# Patient Record
Sex: MALE | Race: WHITE
[De-identification: names, ages, dates, MRNs, and addresses within clinical notes are randomized per-mention and may not be internally consistent; named-entity substitution may affect disease eponyms.]

---

## 2018-08-19 PROBLEM — Z00.00 ENCOUNTER FOR PREVENTIVE HEALTH EXAMINATION: Status: ACTIVE | Noted: 2018-08-19

## 2018-09-07 ENCOUNTER — RECORD ABSTRACTING (OUTPATIENT)
Age: 58
End: 2018-09-07

## 2018-09-07 DIAGNOSIS — Z80.9 FAMILY HISTORY OF MALIGNANT NEOPLASM, UNSPECIFIED: ICD-10-CM

## 2018-09-07 DIAGNOSIS — Z87.39 PERSONAL HISTORY OF OTHER DISEASES OF THE MUSCULOSKELETAL SYSTEM AND CONNECTIVE TISSUE: ICD-10-CM

## 2018-09-07 DIAGNOSIS — Z83.3 FAMILY HISTORY OF DIABETES MELLITUS: ICD-10-CM

## 2018-09-07 LAB
HBA1C MFR BLD: 6.9
PODIATRY EVAL: NORMAL

## 2018-09-07 RX ORDER — ASPIRIN 81 MG/1
81 TABLET, CHEWABLE ORAL DAILY
Refills: 0 | Status: ACTIVE | COMMUNITY

## 2018-09-07 RX ORDER — BLOOD-GLUCOSE METER
KIT MISCELLANEOUS
Refills: 0 | Status: ACTIVE | COMMUNITY

## 2018-09-18 ENCOUNTER — APPOINTMENT (OUTPATIENT)
Dept: ENDOCRINOLOGY | Facility: CLINIC | Age: 58
End: 2018-09-18
Payer: COMMERCIAL

## 2018-09-18 VITALS
WEIGHT: 142 LBS | BODY MASS INDEX: 22.82 KG/M2 | DIASTOLIC BLOOD PRESSURE: 80 MMHG | HEART RATE: 66 BPM | HEIGHT: 66 IN | SYSTOLIC BLOOD PRESSURE: 124 MMHG

## 2018-09-18 DIAGNOSIS — F17.200 NICOTINE DEPENDENCE, UNSPECIFIED, UNCOMPLICATED: ICD-10-CM

## 2018-09-18 DIAGNOSIS — E16.0 DRUG-INDUCED HYPOGLYCEMIA W/OUT COMA: ICD-10-CM

## 2018-09-18 LAB — GLUCOSE BLDC GLUCOMTR-MCNC: 149

## 2018-09-18 PROCEDURE — 99214 OFFICE O/P EST MOD 30 MIN: CPT | Mod: 25

## 2018-09-18 PROCEDURE — 82962 GLUCOSE BLOOD TEST: CPT

## 2018-11-12 ENCOUNTER — MEDICATION RENEWAL (OUTPATIENT)
Age: 58
End: 2018-11-12

## 2018-11-13 ENCOUNTER — MEDICATION RENEWAL (OUTPATIENT)
Age: 58
End: 2018-11-13

## 2018-12-16 ENCOUNTER — MOBILE ON CALL (OUTPATIENT)
Age: 58
End: 2018-12-16

## 2018-12-19 ENCOUNTER — RX RENEWAL (OUTPATIENT)
Age: 58
End: 2018-12-19

## 2019-01-08 ENCOUNTER — RECORD ABSTRACTING (OUTPATIENT)
Age: 59
End: 2019-01-08

## 2019-01-15 ENCOUNTER — APPOINTMENT (OUTPATIENT)
Dept: ENDOCRINOLOGY | Facility: CLINIC | Age: 59
End: 2019-01-15
Payer: COMMERCIAL

## 2019-01-15 VITALS — DIASTOLIC BLOOD PRESSURE: 80 MMHG | SYSTOLIC BLOOD PRESSURE: 130 MMHG

## 2019-01-15 VITALS
BODY MASS INDEX: 23.78 KG/M2 | HEART RATE: 67 BPM | DIASTOLIC BLOOD PRESSURE: 80 MMHG | HEIGHT: 66 IN | SYSTOLIC BLOOD PRESSURE: 140 MMHG | WEIGHT: 148 LBS

## 2019-01-15 LAB
GLUCOSE BLDC GLUCOMTR-MCNC: 82
HBA1C MFR BLD HPLC: 7.2
LDLC SERPL DIRECT ASSAY-MCNC: 35
MICROALBUMIN/CREAT 24H UR-RTO: 73

## 2019-01-15 PROCEDURE — 99214 OFFICE O/P EST MOD 30 MIN: CPT | Mod: 25

## 2019-01-15 RX ORDER — LANCETS 33 GAUGE
EACH MISCELLANEOUS
Qty: 400 | Refills: 1 | Status: ACTIVE | COMMUNITY
Start: 1900-01-01 | End: 1900-01-01

## 2019-01-15 RX ORDER — FLASH GLUCOSE SENSOR
KIT MISCELLANEOUS
Qty: 0 | Refills: 0 | Status: COMPLETED | OUTPATIENT
Start: 2019-01-15

## 2019-01-15 RX ORDER — ROSUVASTATIN CALCIUM 20 MG/1
20 TABLET, FILM COATED ORAL DAILY
Qty: 90 | Refills: 1 | Status: ACTIVE | COMMUNITY
Start: 1900-01-01 | End: 1900-01-01

## 2019-01-15 RX ADMIN — Medication 0: at 00:00

## 2019-02-04 ENCOUNTER — MESSAGE (OUTPATIENT)
Age: 59
End: 2019-02-04

## 2019-04-15 ENCOUNTER — RX RENEWAL (OUTPATIENT)
Age: 59
End: 2019-04-15

## 2019-04-30 ENCOUNTER — APPOINTMENT (OUTPATIENT)
Dept: ENDOCRINOLOGY | Facility: CLINIC | Age: 59
End: 2019-04-30

## 2019-05-13 ENCOUNTER — APPOINTMENT (OUTPATIENT)
Dept: ENDOCRINOLOGY | Facility: CLINIC | Age: 59
End: 2019-05-13
Payer: COMMERCIAL

## 2019-05-13 ENCOUNTER — LABORATORY RESULT (OUTPATIENT)
Age: 59
End: 2019-05-13

## 2019-05-13 DIAGNOSIS — I25.10 ATHEROSCLEROTIC HEART DISEASE OF NATIVE CORONARY ARTERY W/OUT ANGINA PECTORIS: ICD-10-CM

## 2019-05-13 PROCEDURE — 36415 COLL VENOUS BLD VENIPUNCTURE: CPT

## 2019-05-14 LAB
ALBUMIN SERPL ELPH-MCNC: 5.1 G/DL
ALP BLD-CCNC: 54 U/L
ALT SERPL-CCNC: 23 U/L
ANION GAP SERPL CALC-SCNC: 13 MMOL/L
AST SERPL-CCNC: 23 U/L
BILIRUB SERPL-MCNC: 0.8 MG/DL
BUN SERPL-MCNC: 15 MG/DL
CALCIUM SERPL-MCNC: 10.3 MG/DL
CHLORIDE SERPL-SCNC: 104 MMOL/L
CHOLEST SERPL-MCNC: 107 MG/DL
CHOLEST/HDLC SERPL: 2.2 RATIO
CO2 SERPL-SCNC: 25 MMOL/L
CREAT SERPL-MCNC: 0.87 MG/DL
GLUCOSE SERPL-MCNC: 164 MG/DL
HBA1C MFR BLD HPLC: 7.4 %
HDLC SERPL-MCNC: 48 MG/DL
LDLC SERPL CALC-MCNC: 37 MG/DL
POTASSIUM SERPL-SCNC: 4.5 MMOL/L
PROT SERPL-MCNC: 7.2 G/DL
SODIUM SERPL-SCNC: 142 MMOL/L
TRIGL SERPL-MCNC: 108 MG/DL

## 2019-05-20 ENCOUNTER — APPOINTMENT (OUTPATIENT)
Dept: ENDOCRINOLOGY | Facility: CLINIC | Age: 59
End: 2019-05-20
Payer: COMMERCIAL

## 2019-05-20 VITALS
BODY MASS INDEX: 22.18 KG/M2 | SYSTOLIC BLOOD PRESSURE: 110 MMHG | HEART RATE: 80 BPM | WEIGHT: 138 LBS | DIASTOLIC BLOOD PRESSURE: 80 MMHG | HEIGHT: 66 IN

## 2019-05-20 LAB
GLUCOSE BLDC GLUCOMTR-MCNC: 199
HBA1C MFR BLD HPLC: 7.4

## 2019-05-20 PROCEDURE — 99214 OFFICE O/P EST MOD 30 MIN: CPT | Mod: 25

## 2019-05-20 PROCEDURE — 82962 GLUCOSE BLOOD TEST: CPT

## 2019-05-20 PROCEDURE — 83036 HEMOGLOBIN GLYCOSYLATED A1C: CPT | Mod: QW

## 2019-05-20 NOTE — HISTORY OF PRESENT ILLNESS
[FreeTextEntry1] : Pt. is currently scheduled on 19 for left shoulder rotator cuff repair surgery. \par \par Quality: Type 2 DM\par Severity: moderate\par Duration: 18 years \par Onset: routine labs \par Modifying Factors: Better with medication \par Associated Symptoms: denies neuropathy \par \par Current Regimen:\par Metformin  mg 1 tabs BID\par Glimepiride 2 mg 2 tabs daily \par Tradjenta 5 mg daily  \par \par Self blood sugar monitorin-5 times a day, no meter, no logs\par per pt. average blood sugar \par \par exercise: none\par \par Diet:\par B- peanut butter jelly sandwich, egg sandwich\par L- cold cuts\par D- variers\par Snacks- none\par \par Date of last eye exam:  (-) diabetic retinopathy \par Date of last foot exam: 3 years ago\par Date of last flu vaccine: 2018\par Date of last Pneumovax: 2018

## 2019-05-20 NOTE — ASSESSMENT
[Importance of Diet and Exercise] : importance of diet and exercise to improve glycemic control, achieve weight loss and improve cardiovascular health [FreeTextEntry1] : 57 y/o male with Type 2 DM, Hyperlipidemia, and HTN. Today - A1C 7.4% \par \par Pt. is cleared by endocrinology for left shoulder rotator cuff repair on 5/29/19.  \par \par Plan:\par Type 2 DM: labs now\par - continue current medication regimen: Metformin  mg 1 tabs BID, Glimepiride 2 mg 2 tabs daily, and Tradjenta 5 mg daily  \par - continue self blood sugar monitoring\par - bring in meter to next visit\par - discussed modifying diet in detail\par - encouraged to increase routine exercise\par \par Hyperlipidemia: check lipids, continue Rosuvastatin\par \par HTN: stable, continue Ramipril \par \par Labs and follow up visit in 3 months.

## 2019-05-20 NOTE — REVIEW OF SYSTEMS
[Fatigue] : no fatigue [Decreased Appetite] : appetite not decreased [Recent Weight Gain (___ Lbs)] : no recent weight gain [Recent Weight Loss (___ Lbs)] : no recent weight loss [Visual Field Defect] : no visual field defect [Dysphagia] : no dysphagia [Blurry Vision] : no blurred vision [Chest Pain] : no chest pain [Neck Pain] : no neck pain [Dysphonia] : no dysphonia [Palpitations] : no palpitations [SOB on Exertion] : no shortness of breath during exertion [Constipation] : no constipation [Diarrhea] : no diarrhea [Dysuria] : no dysuria [Polyuria] : no polyuria [Headache] : no headaches [Depression] : no depression [Tremors] : no tremors [Anxiety] : no anxiety [Polydipsia] : no polydipsia [Cold Intolerance] : cold tolerant [Heat Intolerance] : heat tolerant [Easy Bruising] : no tendency for easy bruising [Swelling] : no swelling [FreeTextEntry2] : weight stable

## 2019-05-20 NOTE — PHYSICAL EXAM
[Alert] : alert [Well Nourished] : well nourished [Well Developed] : well developed [No Acute Distress] : no acute distress [Normal Hearing] : hearing was normal [EOMI] : extra ocular movement intact [Normal Sclera/Conjunctiva] : normal sclera/conjunctiva [Thyroid Not Enlarged] : the thyroid was not enlarged [No LAD] : no lymphadenopathy [Supple] : the neck was supple [Normal Rate and Effort] : normal respiratory rhythm and effort [No Accessory Muscle Use] : no accessory muscle use [No Thyroid Nodules] : there were no palpable thyroid nodules [Normal S1, S2] : normal S1 and S2 [Normal Rate] : heart rate was normal  [Clear to Auscultation] : lungs were clear to auscultation bilaterally [Regular Rhythm] : with a regular rhythm [Normal Bowel Sounds] : normal bowel sounds [Pedal Pulses Normal] : the pedal pulses are present [No Edema] : there was no peripheral edema [Soft] : abdomen soft [Normal Gait] : normal gait [Not Tender] : non-tender [Right Foot Was Examined] : right foot ~C was examined [No Rash] : no rash [Left Foot Was Examined] : left foot ~C was examined [Normal] : normal [Full ROM] : with full range of motion [No Motor Deficits] : the motor exam was normal [No Tremors] : no tremors [Oriented x3] : oriented to person, place, and time [Normal Insight/Judgement] : insight and judgment were intact [Normal Mood] : the mood was normal [Acanthosis Nigricans] : no acanthosis nigricans [Diminished Throughout Both Feet] : normal tactile sensation with monofilament testing throughout both feet

## 2019-05-20 NOTE — REASON FOR VISIT
[Follow-Up: _____] : a [unfilled] follow-up visit [FreeTextEntry1] : Pre surgical clearance for left shoulder rotator cuff repair

## 2019-05-21 LAB
BASOPHILS # BLD AUTO: 0.1 K/UL
BASOPHILS NFR BLD AUTO: 1.4 %
EOSINOPHIL # BLD AUTO: 0.29 K/UL
EOSINOPHIL NFR BLD AUTO: 4.1 %
HCT VFR BLD CALC: 48.3 %
HGB BLD-MCNC: 14.6 G/DL
IMM GRANULOCYTES NFR BLD AUTO: 0.1 %
LYMPHOCYTES # BLD AUTO: 1.5 K/UL
LYMPHOCYTES NFR BLD AUTO: 21 %
MAN DIFF?: NORMAL
MCHC RBC-ENTMCNC: 28 PG
MCHC RBC-ENTMCNC: 30.2 GM/DL
MCV RBC AUTO: 92.7 FL
MONOCYTES # BLD AUTO: 0.89 K/UL
MONOCYTES NFR BLD AUTO: 12.5 %
NEUTROPHILS # BLD AUTO: 4.34 K/UL
NEUTROPHILS NFR BLD AUTO: 60.9 %
PLATELET # BLD AUTO: 219 K/UL
RBC # BLD: 5.21 M/UL
RBC # FLD: 13.7 %
WBC # FLD AUTO: 7.13 K/UL

## 2019-05-30 ENCOUNTER — RESULT REVIEW (OUTPATIENT)
Age: 59
End: 2019-05-30

## 2019-05-30 LAB
25(OH)D3 SERPL-MCNC: 37.1 NG/ML
ALBUMIN SERPL ELPH-MCNC: 4.5 G/DL
ALP BLD-CCNC: 54 U/L
ALT SERPL-CCNC: 21 U/L
ANION GAP SERPL CALC-SCNC: 11 MMOL/L
AST SERPL-CCNC: 17 U/L
BILIRUB SERPL-MCNC: 0.8 MG/DL
BUN SERPL-MCNC: 15 MG/DL
CALCIUM SERPL-MCNC: 9.6 MG/DL
CHLORIDE SERPL-SCNC: 105 MMOL/L
CHOLEST SERPL-MCNC: 93 MG/DL
CHOLEST/HDLC SERPL: 2.3 RATIO
CO2 SERPL-SCNC: 25 MMOL/L
CREAT SERPL-MCNC: 0.84 MG/DL
ESTIMATED AVERAGE GLUCOSE: 166 MG/DL
GLUCOSE SERPL-MCNC: 158 MG/DL
HBA1C MFR BLD HPLC: 7.4 %
HDLC SERPL-MCNC: 41 MG/DL
LDLC SERPL CALC-MCNC: 40 MG/DL
POTASSIUM SERPL-SCNC: 4.7 MMOL/L
PROT SERPL-MCNC: 6.6 G/DL
SODIUM SERPL-SCNC: 141 MMOL/L
TRIGL SERPL-MCNC: 60 MG/DL
TSH SERPL-ACNC: 1.02 UIU/ML
VIT B12 SERPL-MCNC: 635 PG/ML

## 2019-07-16 ENCOUNTER — MEDICATION RENEWAL (OUTPATIENT)
Age: 59
End: 2019-07-16

## 2019-07-17 ENCOUNTER — RX RENEWAL (OUTPATIENT)
Age: 59
End: 2019-07-17

## 2019-08-20 ENCOUNTER — APPOINTMENT (OUTPATIENT)
Dept: ENDOCRINOLOGY | Facility: CLINIC | Age: 59
End: 2019-08-20

## 2019-10-09 ENCOUNTER — RX RENEWAL (OUTPATIENT)
Age: 59
End: 2019-10-09

## 2019-11-04 ENCOUNTER — RX RENEWAL (OUTPATIENT)
Age: 59
End: 2019-11-04

## 2019-11-12 ENCOUNTER — RX RENEWAL (OUTPATIENT)
Age: 59
End: 2019-11-12

## 2019-12-01 ENCOUNTER — RX RENEWAL (OUTPATIENT)
Age: 59
End: 2019-12-01

## 2019-12-26 ENCOUNTER — RX RENEWAL (OUTPATIENT)
Age: 59
End: 2019-12-26

## 2019-12-31 ENCOUNTER — MEDICATION RENEWAL (OUTPATIENT)
Age: 59
End: 2019-12-31

## 2020-02-14 LAB
HBA1C MFR BLD HPLC: 7.2
HBA1C MFR BLD HPLC: 7.6
LDLC SERPL DIRECT ASSAY-MCNC: 35
LDLC SERPL DIRECT ASSAY-MCNC: 54
MICROALBUMIN/CREAT 24H UR-RTO: 73

## 2020-02-15 ENCOUNTER — APPOINTMENT (OUTPATIENT)
Dept: ENDOCRINOLOGY | Facility: CLINIC | Age: 60
End: 2020-02-15
Payer: COMMERCIAL

## 2020-02-15 VITALS
HEIGHT: 66 IN | DIASTOLIC BLOOD PRESSURE: 76 MMHG | BODY MASS INDEX: 21.21 KG/M2 | HEART RATE: 76 BPM | SYSTOLIC BLOOD PRESSURE: 114 MMHG | WEIGHT: 132 LBS

## 2020-02-15 DIAGNOSIS — J44.9 CHRONIC OBSTRUCTIVE PULMONARY DISEASE, UNSPECIFIED: ICD-10-CM

## 2020-02-15 LAB — GLUCOSE BLDC GLUCOMTR-MCNC: 187

## 2020-02-15 PROCEDURE — 99214 OFFICE O/P EST MOD 30 MIN: CPT | Mod: 25

## 2020-02-15 PROCEDURE — 82962 GLUCOSE BLOOD TEST: CPT

## 2020-02-15 RX ORDER — METOPROLOL SUCCINATE 25 MG/1
25 TABLET, EXTENDED RELEASE ORAL
Qty: 90 | Refills: 0 | Status: ACTIVE | COMMUNITY
Start: 2019-12-09

## 2020-02-15 RX ORDER — BLOOD SUGAR DIAGNOSTIC
STRIP MISCELLANEOUS
Qty: 4 | Refills: 1 | Status: ACTIVE | COMMUNITY
Start: 2019-07-17 | End: 1900-01-01

## 2020-02-15 NOTE — DATA REVIEWED
[FreeTextEntry1] : Labs 11/23/19\par Gluc 131, A1c 7.6\par Cr 0.79, GFR 98\par LDL 54, HDL 43. Tg 75\par TSH 1.40\par Vit D 38

## 2020-02-15 NOTE — ASSESSMENT
[FreeTextEntry1] : Type 2 DM comp by CAD and microalbuminuria- suboptimal control with hyper/hypoglycemia by history\par - continue current medication regimen: Metformin  mg 1 tabs BID, Glimepiride 2 mg 1 tabs daily, and Tradjenta 5 mg daily  \par - continue self blood sugar monitoring, increase testing 4x daily, send logs 1 week\par - keep diet log and send in 1 week\par - f/u Cardio\par - Cont AceI\par - updated A1c needed\par \par Hyperlipidemia: continue Rosuvastatin, updated lipid panel needed\par \par HTN: stable, continue current BP meds\par \par  [Retinopathy Screening] : Patient was referred to ophthalmology for retinopathy screening

## 2020-02-15 NOTE — PHYSICAL EXAM
[Alert] : alert [No Acute Distress] : no acute distress [Well Developed] : well developed [Well Nourished] : well nourished [Normal Sclera/Conjunctiva] : normal sclera/conjunctiva [EOMI] : extra ocular movement intact [Normal Hearing] : hearing was normal [Supple] : the neck was supple [No LAD] : no lymphadenopathy [Thyroid Not Enlarged] : the thyroid was not enlarged [No Thyroid Nodules] : there were no palpable thyroid nodules [Normal Rate and Effort] : normal respiratory rhythm and effort [No Accessory Muscle Use] : no accessory muscle use [Clear to Auscultation] : lungs were clear to auscultation bilaterally [Normal Rate] : heart rate was normal  [Normal S1, S2] : normal S1 and S2 [Regular Rhythm] : with a regular rhythm [Pedal Pulses Normal] : the pedal pulses are present [No Edema] : there was no peripheral edema [Normal Bowel Sounds] : normal bowel sounds [Not Tender] : non-tender [Soft] : abdomen soft [Normal Gait] : normal gait [No Rash] : no rash [No Motor Deficits] : the motor exam was normal [No Tremors] : no tremors [Oriented x3] : oriented to person, place, and time [Normal Insight/Judgement] : insight and judgment were intact [Normal Mood] : the mood was normal [Acanthosis Nigricans] : no acanthosis nigricans

## 2020-02-15 NOTE — HISTORY OF PRESENT ILLNESS
[FreeTextEntry1] : no issues today\par \par Quality: Type 2 DM\par Severity: moderate\par Duration: 18 years \par Onset: routine labs \par Associated Symptoms: \par denies neuropathy, no recent eye exam eye exam 2018 - no DR\par 2019 (+) urine microalb/Cr on Ramipril\par CAd s/p stent. recent stress 2019 okay per pt\par \par Modifying Factors: Better with medication worse with diet\par Current Regimen:\par Metformin  mg 2 tabs BID\par Glimepiride 2 mg 1 tabs daily \par Tradjenta 5 mg daily  \par \par Self blood sugar monitoring: up to 5 times a day, no meter, no logs, reports low sugars overnight 1x/week\par per pt. average blood sugar 124-200\par \par Exercise: none\par \par Diet: eats 3 meals daily, after dinner snack on chips, no cakes or bread\par \par

## 2020-02-15 NOTE — REVIEW OF SYSTEMS
[Recent Weight Loss (___ Lbs)] : recent [unfilled] ~Ulb weight loss [Nocturia] : nocturia [Fatigue] : no fatigue [Decreased Appetite] : appetite not decreased [Recent Weight Gain (___ Lbs)] : no recent weight gain [Visual Field Defect] : no visual field defect [Blurry Vision] : no blurred vision [Chest Pain] : no chest pain [Palpitations] : no palpitations [Shortness Of Breath] : no shortness of breath [SOB on Exertion] : no shortness of breath during exertion [Diarrhea] : no diarrhea [Abdominal Pain] : no abdominal pain [Polyuria] : no polyuria [Tremors] : no tremors [Dizziness] : no dizziness [Depression] : no depression [Pain/Numbness of Digits] : no pain/numbness of digits [Anxiety] : no anxiety [Polydipsia] : no polydipsia [Cold Intolerance] : cold tolerant [Heat Intolerance] : heat tolerant [Swelling] : no swelling

## 2020-04-01 ENCOUNTER — RX RENEWAL (OUTPATIENT)
Age: 60
End: 2020-04-01

## 2020-06-06 ENCOUNTER — APPOINTMENT (OUTPATIENT)
Dept: ENDOCRINOLOGY | Facility: CLINIC | Age: 60
End: 2020-06-06
Payer: COMMERCIAL

## 2020-06-06 PROCEDURE — 99214 OFFICE O/P EST MOD 30 MIN: CPT | Mod: 95

## 2020-06-06 NOTE — ASSESSMENT
[FreeTextEntry1] : Type 2 DM comp by CAD and microalbuminuria-improved A1c, pt reports afternoon mild hypoglycemia, fasting hyperglycemia\par - change Glimepiride 1 mg before BF and DInner\par - cont MFN  mg 2 tabs BID (pt advised to call pharmacy regarding refill)\par - cont Tradjenta 5 mg daily\par - repeat labs 3 months\par \par Hyperlipidemia: continue Rosuvastatin, updated lipid panel needed, call labs for results\par \par HTN: continue current BP meds\par \par

## 2020-06-06 NOTE — PHYSICAL EXAM
[Alert] : alert [Well Nourished] : well nourished [Healthy Appearance] : healthy appearance [No Acute Distress] : no acute distress [EOMI] : extra ocular movement intact [Normal Sclera/Conjunctiva] : normal sclera/conjunctiva [No Accessory Muscle Use] : no accessory muscle use [No Respiratory Distress] : no respiratory distress [No Edema] : no peripheral edema [Foot Ulcers] : no foot ulcers [Oriented x3] : oriented to person, place, and time [Normal Affect] : the affect was normal [Normal Insight/Judgement] : insight and judgment were intact [Normal Mood] : the mood was normal

## 2020-06-06 NOTE — REVIEW OF SYSTEMS
[Fatigue] : no fatigue [Recent Weight Loss (___ Lbs)] : recent weight loss: [unfilled] lbs [Blurred Vision] : no blurred vision [Chest Pain] : no chest pain [Shortness Of Breath] : shortness of breath [SOB on Exertion] : no shortness of breath on exertion [Abdominal Pain] : no abdominal pain [Diarrhea] : no diarrhea [Polyuria] : no polyuria [Nocturia] : nocturia [Pain/Numbness of Digits] : no pain/numbness of digits [Depression] : no depression [Anxiety] : no anxiety [FreeTextEntry6] : SOB w arms over head, CXR was normal [Polydipsia] : no polydipsia [FreeTextEntry8] : nocturia 5x nightly for a while

## 2020-06-06 NOTE — DATA REVIEWED
[FreeTextEntry1] : Labs 2/22/20\par Gluc 148, A1c 7.5\par Cr 1.18, GFR 66\par urine mciroalb/Cr 203\par LDL 30, HDL 36, Tg 116\par TSH 0.81\par B12 479\par \par Labs 6/3/20\par A1c 7.2

## 2020-06-06 NOTE — HISTORY OF PRESENT ILLNESS
[FreeTextEntry1] : Interval Hx: no changes, no issues. lost weight, not eating a lot. eating 3 meals daily, takes all meds with BF, 430 pm FS 70-75 - then loss of appetite. trying to have afternoon snack which helped low sugars. weight 127 pounds\par \par Quality: Type 2 DM\par Severity: moderate\par Duration: 18 years \par Onset: routine labs \par Associated Symptoms: \par denies neuropathy, no recent eye exam eye exam 2018 - no DR\par 2019 (+) urine microalb/Cr on Ramipril\par CAD s/p stent. recent stress 2019 okay per pt\par \par Modifying Factors: Better with medication worse with diet\par Current Regimen:\par Metformin  mg 2 tabs BID\par Glimepiride 2 mg 1 tabs daily \par Tradjenta 5 mg daily  \par \par SMBG - testing 5-6x daily, daytime 's. fasting 150's\par \par Exercise: none during pandemic\par Diet: as above\par \par

## 2020-06-06 NOTE — REASON FOR VISIT
[Other Location: e.g. Home (Enter Location, City,State)___] : at [unfilled] [Home] : at home, [unfilled] , at the time of the visit. [Follow-Up: _____] : a [unfilled] follow-up visit [Verbal consent obtained from patient] : the patient, [unfilled] [FreeTextEntry1] :  T2DM ,HLD and HTN

## 2020-07-02 ENCOUNTER — RX RENEWAL (OUTPATIENT)
Age: 60
End: 2020-07-02

## 2020-08-18 ENCOUNTER — RX RENEWAL (OUTPATIENT)
Age: 60
End: 2020-08-18

## 2020-08-18 RX ORDER — RAMIPRIL 5 MG/1
5 CAPSULE ORAL DAILY
Qty: 90 | Refills: 1 | Status: ACTIVE | COMMUNITY
Start: 2020-04-01 | End: 1900-01-01

## 2020-09-23 ENCOUNTER — APPOINTMENT (OUTPATIENT)
Dept: ENDOCRINOLOGY | Facility: CLINIC | Age: 60
End: 2020-09-23

## 2020-12-29 ENCOUNTER — RESULT REVIEW (OUTPATIENT)
Age: 60
End: 2020-12-29

## 2021-01-28 LAB
HBA1C MFR BLD HPLC: 7.2
LDLC SERPL DIRECT ASSAY-MCNC: 42

## 2021-01-29 ENCOUNTER — APPOINTMENT (OUTPATIENT)
Dept: ENDOCRINOLOGY | Facility: CLINIC | Age: 61
End: 2021-01-29
Payer: COMMERCIAL

## 2021-01-29 PROCEDURE — 99443: CPT

## 2021-01-29 RX ORDER — GLIMEPIRIDE 2 MG/1
2 TABLET ORAL TWICE DAILY
Qty: 180 | Refills: 1 | Status: DISCONTINUED | COMMUNITY
Start: 2020-07-02 | End: 2021-01-29

## 2021-01-29 RX ORDER — PANTOPRAZOLE 20 MG/1
20 TABLET, DELAYED RELEASE ORAL
Refills: 0 | Status: ACTIVE | COMMUNITY
Start: 2019-05-23

## 2021-01-29 NOTE — REVIEW OF SYSTEMS
[Recent Weight Loss (___ Lbs)] : recent weight loss: [unfilled] lbs [Blurred Vision] : no blurred vision [Chest Pain] : no chest pain [Shortness Of Breath] : no shortness of breath [SOB on Exertion] : no shortness of breath on exertion [Nausea] : no nausea [Abdominal Pain] : no abdominal pain [Diarrhea] : no diarrhea [Polyuria] : no polyuria [Nocturia] : nocturia [Pain/Numbness of Digits] : no pain/numbness of digits [Polydipsia] : no polydipsia [FreeTextEntry2] : current weight 113 pounds

## 2021-01-29 NOTE — HISTORY OF PRESENT ILLNESS
[FreeTextEntry1] : Interval Hx: \par - 4 weeks ago dx w non small cell lung cancer in Left lung and undergoing RT 5 days/week and chemotherapy 1x/week until March 2021. reports hyperglycemia with FS 250s due to steroids for pretreatment - reports high sugars for 2 days after injection.  Off steroids FS ranging 115-185\par - insurance does not cover Tradjenta as of March 1, 2021 and will cover Januvia\par - mid March he is moving to [place], retiring Feb 12, 2021\par \par Quality: Type 2 DM\par Severity: moderate\par Duration: 18 years \par Onset: routine labs \par Associated Symptoms: \par denies neuropathy, no recent eye exam eye exam 2018 - no DR\par 2019 (+) urine microalb/Cr on Ramipril\par CAD s/p stent. recent stress 2019 okay per pt\par \par Modifying Factors: Better with medication worse with diet\par Current Regimen: off Glimepiride since last visit due to hypoglycemia from poor diet/weight loss\par Metformin  mg 2 tabs BID\par Tradjenta 5 mg daily  \par \par SMBG - testing 5-6x daily\par \par \par

## 2021-01-29 NOTE — REASON FOR VISIT
[Home] : at home, [unfilled] , at the time of the visit. [Medical Office: ([facility])___] : at the medical office located in  [Verbal consent obtained from patient] : the patient, [unfilled] [Follow - Up] : a follow-up visit [DM Type 2] : DM Type 2

## 2021-01-29 NOTE — DATA REVIEWED
[FreeTextEntry1] : Labs 2/22/20\par Gluc 148, A1c 7.5\par Cr 1.18, GFR 66\par urine mciroalb/Cr 203\par LDL 30, HDL 36, Tg 116\par TSH 0.81\par B12 479\par \par Labs 6/3/20\par A1c 7.2\par \par Labs 10/1/20\par LDL 45, Tg 68, HDL 41\par Gluc 156, A1c 7.3\par Cr 0.98, GFR 83\par TSH 0.95

## 2021-01-29 NOTE — ASSESSMENT
[FreeTextEntry1] : Type 2 DM comp by CAD and microalbuminuria-reports worsening glucoses w severe hyperglycemia for 2-3 days/week due to pretreatment with steroids for chemotherapy\par - restart Glimepiride 1 mg daily day of steroids, day after steroids and 2 days after steroids\par - cont MFN  mg 2 tabs BID\par - cont Tradjenta 5 mg daily, switch to Januvia 100 mg daily\par - repeat A1c needed\par - call with numbers after starting Glimepiride\par - needs eye exam w ophthalmology\par \par Hyperlipidemia: continue Rosuvastatin, updated lipid panel needed\par \par [place] moving [place] after house closing - would like to see pt for office visit prior to departure\par \par \par

## 2021-02-02 RX ORDER — BLOOD SUGAR DIAGNOSTIC
STRIP MISCELLANEOUS
Qty: 4 | Refills: 1 | Status: ACTIVE | COMMUNITY
Start: 2020-07-02 | End: 1900-01-01

## 2021-02-12 ENCOUNTER — NON-APPOINTMENT (OUTPATIENT)
Age: 61
End: 2021-02-12

## 2021-03-15 LAB
HBA1C MFR BLD HPLC: 7.3
LDLC SERPL DIRECT ASSAY-MCNC: 45
MICROALBUMIN/CREAT 24H UR-RTO: 11

## 2021-03-16 ENCOUNTER — APPOINTMENT (OUTPATIENT)
Dept: ENDOCRINOLOGY | Facility: CLINIC | Age: 61
End: 2021-03-16
Payer: COMMERCIAL

## 2021-03-16 VITALS
SYSTOLIC BLOOD PRESSURE: 124 MMHG | DIASTOLIC BLOOD PRESSURE: 78 MMHG | BODY MASS INDEX: 18 KG/M2 | WEIGHT: 112 LBS | HEIGHT: 66 IN

## 2021-03-16 DIAGNOSIS — R80.9 TYPE 2 DIABETES MELLITUS WITH OTHER DIABETIC KIDNEY COMPLICATION: ICD-10-CM

## 2021-03-16 DIAGNOSIS — E11.65 TYPE 2 DIABETES MELLITUS WITH HYPERGLYCEMIA: ICD-10-CM

## 2021-03-16 DIAGNOSIS — I10 ESSENTIAL (PRIMARY) HYPERTENSION: ICD-10-CM

## 2021-03-16 DIAGNOSIS — E78.5 HYPERLIPIDEMIA, UNSPECIFIED: ICD-10-CM

## 2021-03-16 DIAGNOSIS — Z79.899 OTHER LONG TERM (CURRENT) DRUG THERAPY: ICD-10-CM

## 2021-03-16 DIAGNOSIS — E11.59 TYPE 2 DIABETES MELLITUS WITH OTHER CIRCULATORY COMPLICATIONS: ICD-10-CM

## 2021-03-16 DIAGNOSIS — E11.29 TYPE 2 DIABETES MELLITUS WITH OTHER DIABETIC KIDNEY COMPLICATION: ICD-10-CM

## 2021-03-16 LAB — GLUCOSE BLDC GLUCOMTR-MCNC: 189

## 2021-03-16 PROCEDURE — 99072 ADDL SUPL MATRL&STAF TM PHE: CPT

## 2021-03-16 PROCEDURE — 82962 GLUCOSE BLOOD TEST: CPT

## 2021-03-16 PROCEDURE — 99214 OFFICE O/P EST MOD 30 MIN: CPT | Mod: 25

## 2021-03-16 RX ORDER — TIOTROPIUM BROMIDE INHALATION SPRAY 1.56 UG/1
SPRAY, METERED RESPIRATORY (INHALATION)
Refills: 0 | Status: DISCONTINUED | COMMUNITY
End: 2021-03-16

## 2021-03-16 RX ORDER — FLUTICASONE FUROATE, UMECLIDINIUM BROMIDE AND VILANTEROL TRIFENATATE 200; 62.5; 25 UG/1; UG/1; UG/1
POWDER RESPIRATORY (INHALATION)
Refills: 0 | Status: ACTIVE | COMMUNITY

## 2021-03-16 RX ORDER — LINAGLIPTIN 5 MG/1
5 TABLET, FILM COATED ORAL
Qty: 90 | Refills: 0 | Status: DISCONTINUED | COMMUNITY
Start: 2018-12-19 | End: 2021-03-16

## 2021-03-16 RX ORDER — GLIMEPIRIDE 1 MG/1
1 TABLET ORAL
Qty: 90 | Refills: 0 | Status: DISCONTINUED | COMMUNITY
End: 2021-03-16

## 2021-03-16 NOTE — ASSESSMENT
[FreeTextEntry1] : 60 year old male with T2DM and associated CAD and microalbuminuria. Recent hyperglycemia secondary to steroids with chemotherapy, improved with glimepiride. No longer taking glimepiride with the completion of chemotherapy.\par \par 1. T2DM with associated CAD and microalbuminuria- improving BG since completing chemotherapy.\par -Continue Metformin  mg, 2 tabs BID.\par -Finish current supply of Tradjenta, then switch to Januvia 100 mg daily for insurance purposes.\par -Advised repeat A1c in 3 months; patient will be living in [place] by then. Plans to establish care with new endocrinologist down there.\par -Call office if move is delayed for follow-up visit.\par \par 2. Hyperlipidemia- LDL acceptable.\par -Continue statin.\par -Repeat lipids in 3 months.\par \par

## 2021-03-16 NOTE — REVIEW OF SYSTEMS
[Recent Weight Loss (___ Lbs)] : recent weight loss: [unfilled] lbs [Shortness Of Breath] : shortness of breath [Blurred Vision] : no blurred vision [Chest Pain] : no chest pain [Palpitations] : no palpitations [Nausea] : no nausea [Abdominal Pain] : no abdominal pain [Vomiting] : no vomiting [Polyuria] : no polyuria [Pain/Numbness of Digits] : no pain/numbness of digits [Polydipsia] : no polydipsia

## 2021-03-16 NOTE — HISTORY OF PRESENT ILLNESS
[FreeTextEntry1] : Interval Hx: \par -Dx w non small cell lung cancer in Left lung- had RT 5 days/week and chemotherapy 1x/week, completed March 4, 2021. Has PET scan in 4 weeks.\par -Pretreatment with steroids before chemotherapy resulting in hyperglycemia 250s to 300s x 3 days after each treatment. Blood glucose improved with use of glimepiride on chemo days and 2 days following, but no longer taking since completing chemo.\par - insurance does not cover Tradjenta as of March 1, 2021 and will cover Januvia\par - Retired Feb 12, 2021; moving to [place] later this month as soon as he closes on his house.\par \par Quality: Type 2 DM\par Severity: moderate\par Duration: 18 years \par Onset: routine labs \par \par Associated Symptoms: \par denies neuropathy\par eye exam: 2/9/21- no DR\par 2019 (+) urine microalb/Cr on Ramipril\par CAD s/p stent. recent stress 2019 okay per pt\par \par Modifying Factors: Better with medication, worse due to diet (trying to increase calorie intake and gain weight lost with chemo).\par Current Regimen: off Glimepiride now that chemotherapy is done.\par Metformin  mg 2 tabs BID\par Tradjenta 5 mg daily- will be switching to Januvia for insurance purposes once current supply runs out.\par \par SMBG 3-4x daily, fasting, before dinner, two hours after dinner, and any time he isn't feeling right.\par Reports  BG is usually <170. Sometimes has readings >200 because he is trying to gain weight and not always making diet choices that are good for his BG.\par Since finishing chemo and radiation- reports low in the 120s and high in the 200s after eating ice cream.\par Current , fasting.\par \par Diet: Having a lot of Glucerna shakes with almond oil to increase caloric intake. Radiation caused burning to his esophagus so some days it is hard to eat anything other than soft foods.

## 2021-03-16 NOTE — PHYSICAL EXAM
[Alert] : alert [No Acute Distress] : no acute distress [Well Developed] : well developed [Normal Sclera/Conjunctiva] : normal sclera/conjunctiva [EOMI] : extra ocular movement intact [No Proptosis] : no proptosis [Thyroid Not Enlarged] : the thyroid was not enlarged [No Thyroid Nodules] : no palpable thyroid nodules [No Respiratory Distress] : no respiratory distress [No Accessory Muscle Use] : no accessory muscle use [Clear to Auscultation] : lungs were clear to auscultation bilaterally [Normal S1, S2] : normal S1 and S2 [Normal Rate] : heart rate was normal [Regular Rhythm] : with a regular rhythm [No Edema] : no peripheral edema [Normal Anterior Cervical Nodes] : no anterior cervical lymphadenopathy [Normal Posterior Cervical Nodes] : no posterior cervical lymphadenopathy [No Stigmata of Cushings Syndrome] : no stigmata of Cushings Syndrome [Oriented x3] : oriented to person, place, and time [Normal Affect] : the affect was normal [Normal Mood] : the mood was normal [Acanthosis Nigricans] : no acanthosis nigricans [de-identified] : thin

## 2021-04-06 RX ORDER — METFORMIN HYDROCHLORIDE 500 MG/1
500 TABLET, COATED ORAL
Qty: 360 | Refills: 1 | Status: ACTIVE | COMMUNITY
Start: 2019-04-15 | End: 1900-01-01

## 2021-08-04 RX ORDER — SITAGLIPTIN 100 MG/1
100 TABLET, FILM COATED ORAL DAILY
Qty: 60 | Refills: 0 | Status: ACTIVE | COMMUNITY
Start: 2021-03-16 | End: 1900-01-01